# Patient Record
Sex: FEMALE | Race: WHITE
[De-identification: names, ages, dates, MRNs, and addresses within clinical notes are randomized per-mention and may not be internally consistent; named-entity substitution may affect disease eponyms.]

---

## 2018-03-17 ENCOUNTER — HOSPITAL ENCOUNTER (EMERGENCY)
Dept: HOSPITAL 17 - PHED | Age: 38
Discharge: HOME | End: 2018-03-17
Payer: COMMERCIAL

## 2018-03-17 VITALS
SYSTOLIC BLOOD PRESSURE: 127 MMHG | HEART RATE: 101 BPM | OXYGEN SATURATION: 95 % | DIASTOLIC BLOOD PRESSURE: 82 MMHG | RESPIRATION RATE: 16 BRPM

## 2018-03-17 VITALS — HEIGHT: 65 IN | WEIGHT: 136.69 LBS | BODY MASS INDEX: 22.77 KG/M2

## 2018-03-17 VITALS
TEMPERATURE: 98.3 F | OXYGEN SATURATION: 100 % | RESPIRATION RATE: 16 BRPM | HEART RATE: 112 BPM | DIASTOLIC BLOOD PRESSURE: 95 MMHG | SYSTOLIC BLOOD PRESSURE: 151 MMHG

## 2018-03-17 VITALS — SYSTOLIC BLOOD PRESSURE: 152 MMHG | DIASTOLIC BLOOD PRESSURE: 91 MMHG

## 2018-03-17 VITALS — RESPIRATION RATE: 16 BRPM | OXYGEN SATURATION: 95 %

## 2018-03-17 DIAGNOSIS — I47.1: Primary | ICD-10-CM

## 2018-03-17 DIAGNOSIS — R42: ICD-10-CM

## 2018-03-17 DIAGNOSIS — R51: ICD-10-CM

## 2018-03-17 DIAGNOSIS — R00.2: ICD-10-CM

## 2018-03-17 DIAGNOSIS — R94.31: ICD-10-CM

## 2018-03-17 LAB
ALBUMIN SERPL-MCNC: 4.1 GM/DL (ref 3.4–5)
ALP SERPL-CCNC: 84 U/L (ref 45–117)
ALT SERPL-CCNC: 23 U/L (ref 10–53)
AST SERPL-CCNC: 13 U/L (ref 15–37)
BASOPHILS # BLD AUTO: 0 TH/MM3 (ref 0–0.2)
BASOPHILS NFR BLD: 0.3 % (ref 0–2)
BILIRUB SERPL-MCNC: 0.3 MG/DL (ref 0.2–1)
BUN SERPL-MCNC: 10 MG/DL (ref 7–18)
CALCIUM SERPL-MCNC: 9.2 MG/DL (ref 8.5–10.1)
CHLORIDE SERPL-SCNC: 104 MEQ/L (ref 98–107)
CREAT SERPL-MCNC: 0.92 MG/DL (ref 0.5–1)
EOSINOPHIL # BLD: 0.1 TH/MM3 (ref 0–0.4)
EOSINOPHIL NFR BLD: 1.4 % (ref 0–4)
ERYTHROCYTE [DISTWIDTH] IN BLOOD BY AUTOMATED COUNT: 12.5 % (ref 11.6–17.2)
GFR SERPLBLD BASED ON 1.73 SQ M-ARVRAT: 68 ML/MIN (ref 89–?)
GLUCOSE SERPL-MCNC: 138 MG/DL (ref 74–106)
HCO3 BLD-SCNC: 27.2 MEQ/L (ref 21–32)
HCT VFR BLD CALC: 45.6 % (ref 35–46)
HGB BLD-MCNC: 15.8 GM/DL (ref 11.6–15.3)
INR PPP: 1 RATIO
LYMPHOCYTES # BLD AUTO: 2.7 TH/MM3 (ref 1–4.8)
LYMPHOCYTES NFR BLD AUTO: 30.6 % (ref 9–44)
MCH RBC QN AUTO: 31.2 PG (ref 27–34)
MCHC RBC AUTO-ENTMCNC: 34.5 % (ref 32–36)
MCV RBC AUTO: 90.3 FL (ref 80–100)
MONOCYTE #: 0.5 TH/MM3 (ref 0–0.9)
MONOCYTES NFR BLD: 5.2 % (ref 0–8)
NEUTROPHILS # BLD AUTO: 5.6 TH/MM3 (ref 1.8–7.7)
NEUTROPHILS NFR BLD AUTO: 62.5 % (ref 16–70)
PLATELET # BLD: 276 TH/MM3 (ref 150–450)
PMV BLD AUTO: 7.6 FL (ref 7–11)
PROT SERPL-MCNC: 8.7 GM/DL (ref 6.4–8.2)
PROTHROMBIN TIME: 10.6 SEC (ref 9.8–11.6)
RBC # BLD AUTO: 5.05 MIL/MM3 (ref 4–5.3)
SODIUM SERPL-SCNC: 137 MEQ/L (ref 136–145)
TROPONIN I SERPL-MCNC: (no result) NG/ML (ref 0.02–0.05)
WBC # BLD AUTO: 8.9 TH/MM3 (ref 4–11)

## 2018-03-17 PROCEDURE — 84443 ASSAY THYROID STIM HORMONE: CPT

## 2018-03-17 PROCEDURE — 99285 EMERGENCY DEPT VISIT HI MDM: CPT

## 2018-03-17 PROCEDURE — 71045 X-RAY EXAM CHEST 1 VIEW: CPT

## 2018-03-17 PROCEDURE — 85730 THROMBOPLASTIN TIME PARTIAL: CPT

## 2018-03-17 PROCEDURE — 84484 ASSAY OF TROPONIN QUANT: CPT

## 2018-03-17 PROCEDURE — 93005 ELECTROCARDIOGRAM TRACING: CPT

## 2018-03-17 PROCEDURE — 85610 PROTHROMBIN TIME: CPT

## 2018-03-17 PROCEDURE — 85025 COMPLETE CBC W/AUTO DIFF WBC: CPT

## 2018-03-17 PROCEDURE — 70551 MRI BRAIN STEM W/O DYE: CPT

## 2018-03-17 PROCEDURE — 80053 COMPREHEN METABOLIC PANEL: CPT

## 2018-03-17 NOTE — PD
HPI


Chief Complaint:  Cardiac Complaint


Time Seen by Provider:  13:12


Travel History


International Travel<30 days:  No


Contact w/Intl Traveler<30days:  No


Traveled to known affect area:  No





History of Present Illness


HPI


37-year-old female complains of palpitation, headache, dizziness.  Patient 

states that she has history of intermittent SVT since she was younger.  Patient 

was seen by cardiologist on personal physician and had thyroid test and 

echocardiogram done in the past.  Patient is not on any medication for 

tachycardia.  Patient states that she has tachycardia in the 140s-160s at home 

for the past 3 days.  Patient denies any excessive caffeine intake.  Patient 

denies any chest pain or shortness of breath.  Patient states that she has had 

intermittent lightheadedness, feeling things spinning around her the past 4 

days.  Patient states that she has mild aching headache diffuse over the head.  

Patient denies any visual change.  Patient denies any neck pain.  Patient 

denies abdominal pain.  Patient denies any nausea vomiting diarrhea.  Patient 

was seen in the local urgent care center and advised to take meclizine 3 times 

a day.  Patient states that she has intermittent lightheadedness despite taking 

the meclizine.  Patient states that the dizziness is not associated with head 

movement.  Patient denies any ringing in the ears.  Patient has been eating 

well.  Patient denies any chance of being pregnant.  Patient denies any dysuria 

or frequency.  Patient denies any vaginal discharge or bleeding.  Patient 

states that she was on medication for control intermittent SVT in the past.





PFSH


Past Medical History


Medical History:  Denies Significant Hx


Tetanus Vaccination:  < 5 Years


Influenza Vaccination:  No


Pregnant?:  Not Pregnant


LMP:  2/1/18





Past Surgical History


Other Surgery:  Yes (CYST REMOVED FROM )





Social History


Alcohol Use:  Yes (SOCIAL)


Tobacco Use:  No


Substance Use:  No





Allergies-Medications


(Allergen,Severity, Reaction):  


Coded Allergies:  


     No Known Allergies (Unverified , 3/17/18)


Reported Meds & Prescriptions





Reported Meds & Active Scripts


Active


No Active Prescriptions or Reported Medications    








Review of Systems


General / Constitutional:  No: Fever


Eyes:  No: Visual changes


HENT:  Positive: Headaches, Lightheadedness


Cardiovascular:  Positive: Tachycardia, No: Chest Pain or Discomfort


Respiratory:  No: Shortness of Breath


Gastrointestinal:  No: Abdominal Pain


Genitourinary:  No: Dysuria


Musculoskeletal:  No: Pain


Skin:  No Rash


Neurologic:  No: Weakness


Psychiatric:  No: Depression


Endocrine:  No: Polydipsia


Hematologic/Lymphatic:  No: Easy Bruising





Physical Exam


Narrative


GENERAL: Well-nourished, well-developed patient.


SKIN: Focused skin assessment warm/dry.


HEAD: Normocephalic.


EYES: No scleral icterus. No injection or drainage.  Pupils 2 mm equal reactive.


NECK: Supple, trachea midline. No JVD or lymphadenopathy.


CARDIOVASCULAR: Mild tachycardia rate and rhythm without murmurs, gallops, or 

rubs. 


RESPIRATORY: Breath sounds equal bilaterally. No accessory muscle use.


GASTROINTESTINAL: Abdomen soft, non-tender, nondistended. 


MUSCULOSKELETAL: No cyanosis, or edema. 


BACK: Nontender without obvious deformity. No CVA tenderness.


Neurologic exam normal.





Data


Data


Last Documented VS





Vital Signs








  Date Time  Temp Pulse Resp B/P (MAP) Pulse Ox O2 Delivery O2 Flow Rate FiO2


 


3/17/18 15:16  101 16 127/82 (97) 95 Room Air  


 


3/17/18 13:01 98.3       








Orders





 Orders


Electrocardiogram (3/17/18 13:26)


Complete Blood Count With Diff (3/17/18 13:26)


Comprehensive Metabolic Panel (3/17/18 13:26)


Troponin I (3/17/18 13:26)


Prothrombin Time / Inr (Pt) (3/17/18 13:26)


Act Partial Throm Time (Ptt) (3/17/18 13:26)


Thyroid Stimulating Hormone (3/17/18 13:26)


Chest, Single Ap (3/17/18 13:26)


Iv Access Insert/Monitor (3/17/18 13:26)


Ecg Monitoring (3/17/18 13:26)


Oximetry (3/17/18 13:26)


Mri Brain W/O Contrast (3/17/18 13:26)


Meclizine (Antivert) (3/17/18 16:00)





Labs





Laboratory Tests








Test


  3/17/18


13:40


 


White Blood Count 8.9 TH/MM3 


 


Red Blood Count 5.05 MIL/MM3 


 


Hemoglobin 15.8 GM/DL 


 


Hematocrit 45.6 % 


 


Mean Corpuscular Volume 90.3 FL 


 


Mean Corpuscular Hemoglobin 31.2 PG 


 


Mean Corpuscular Hemoglobin


Concent 34.5 % 


 


 


Red Cell Distribution Width 12.5 % 


 


Platelet Count 276 TH/MM3 


 


Mean Platelet Volume 7.6 FL 


 


Neutrophils (%) (Auto) 62.5 % 


 


Lymphocytes (%) (Auto) 30.6 % 


 


Monocytes (%) (Auto) 5.2 % 


 


Eosinophils (%) (Auto) 1.4 % 


 


Basophils (%) (Auto) 0.3 % 


 


Neutrophils # (Auto) 5.6 TH/MM3 


 


Lymphocytes # (Auto) 2.7 TH/MM3 


 


Monocytes # (Auto) 0.5 TH/MM3 


 


Eosinophils # (Auto) 0.1 TH/MM3 


 


Basophils # (Auto) 0.0 TH/MM3 


 


CBC Comment DIFF FINAL 


 


Differential Comment  


 


Prothrombin Time 10.6 SEC 


 


Prothromb Time International


Ratio 1.0 RATIO 


 


 


Activated Partial


Thromboplast Time 28.6 SEC 


 


 


Blood Urea Nitrogen 10 MG/DL 


 


Creatinine 0.92 MG/DL 


 


Random Glucose 138 MG/DL 


 


Total Protein 8.7 GM/DL 


 


Albumin 4.1 GM/DL 


 


Calcium Level 9.2 MG/DL 


 


Alkaline Phosphatase 84 U/L 


 


Aspartate Amino Transf


(AST/SGOT) 13 U/L 


 


 


Alanine Aminotransferase


(ALT/SGPT) 23 U/L 


 


 


Total Bilirubin 0.3 MG/DL 


 


Sodium Level 137 MEQ/L 


 


Potassium Level 3.5 MEQ/L 


 


Chloride Level 104 MEQ/L 


 


Carbon Dioxide Level 27.2 MEQ/L 


 


Anion Gap 6 MEQ/L 


 


Estimat Glomerular Filtration


Rate 68 ML/MIN 


 


 


Troponin I


  LESS THAN 0.02


NG/ML


 


Thyroid Stimulating Hormone


3rd Gen 2.720 uIU/ML 


 











MDM


Medical Decision Making


Medical Screen Exam Complete:  Yes


Emergency Medical Condition:  Yes


Interpretation(s)





Last Impressions








Chest X-Ray 3/17/18 1326 Signed





Impressions: 





 Service Date/Time:  Saturday, March 17, 2018 13:49 - CONCLUSION: No acute 





 disease.       Jannet Kunz MD 





1454 PM.  CBC within normal limits.  CMP within normal limits.  Cardiac enzymes 

are normal.


Differential Diagnosis


Differential diagnosis including SVT, sinus tachycardia, PVCs, atrial 

fibrillation, atrial flutter, thyroid disease, vertigo, electrolyte imbalance, 

TIA, CVA.


Narrative Course


37-year-old female with tachycardia, dizziness, headache.  History of SVT in 

the past.  Meclizine 25 mg p.o. given.





Diagnosis





 Primary Impression:  


 Paroxysmal SVT (supraventricular tachycardia)


Patient Instructions:  General Instructions





***Additional Instructions:  


Metoprolol as needed for tachycardia.  Meclizine as needed for the dizziness.  

Follow-up with personal physician and neurologist.  Return if worse.


***Med/Other Pt SpecificInfo:  Prescription(s) given


Scripts


Meclizine (Meclizine) 25 Mg Tab


25 MG PO TID Y for VERTIGO, #21 TAB 0 Refills


   Prov: Rodney Mcghee MD         3/17/18 


Metoprolol Tartrate (Metoprolol Tartrate) 25 Mg Tab


25 MG PO BID for TACHYCARDIA, #60 TAB 0 Refills


   Prov: Rodney Mcghee MD         3/17/18


Disposition:  01 DISCHARGE HOME


Condition:  Stable











Rodney Mcghee MD Mar 17, 2018 13:48

## 2018-03-17 NOTE — RADRPT
EXAM DATE/TIME:  03/17/2018 13:49 

 

[facility] COMPARISON:     

No previous studies available for comparison.

 

                     

INDICATIONS :     

Rapid heart rate, dizzy

                     

 

MEDICAL HISTORY :     

None.          

 

SURGICAL HISTORY :     

None.   

 

ENCOUNTER:     

Initial                                        

 

ACUITY:     

4 - 6 days      

 

PAIN SCORE:     

0/10

 

LOCATION:     

Bilateral chest 

 

FINDINGS:     

A single view of the chest demonstrates the lungs to be symmetrically aerated without evidence of mas
s, infiltrate or effusion.  The cardiomediastinal contours are unremarkable.  Osseous structures are 
intact.

 

CONCLUSION:     No acute disease.  

 

 

 

 Jannet Kunz MD on March 17, 2018 at 13:57           

Board Certified Radiologist.

 This report was verified electronically.

## 2018-03-17 NOTE — RADRPT
EXAM DATE/TIME:  03/17/2018 15:28 

 

[facility] COMPARISON:     

No previous studies available for comparison.

       

 

 

INDICATIONS :     

Cephalgia. Dizziness.

                     

 

MEDICAL HISTORY :     

None.     

 

SURGICAL HISTORY :     

None.     

 

ENCOUNTER:     

Initial

 

ACUITY:     

1 day

 

PAIN SCORE:     

0/10

 

LOCATION:       

cranial 

 

TECHNIQUE:     

Multiplanar, multisequence MRI of the brain was performed without contrast.

 

FINDINGS:     

 

CEREBRUM:     

The ventricles are normal for age.  No evidence of midline shift, mass lesion, hemorrhage or acute in
farction.  No extraaxial fluid collections are seen.  The pituitary gland and suprasellar cistern are
 normal in configuration.

 

WHITE MATTER:     

No significant signal abnormalities are seen in the white matter.

 

POSTERIOR FOSSA:     

The cerebellum and brainstem are intact.  The 4th ventricle is midline. The cerebellopontine angle is
 unremarkable.  The cerebellar tonsils are normal in position.

 

DIFFUSION IMAGING:     

No focal areas of restricted diffusion are seen.  No evidence of acute infarction.

 

EXTRACRANIAL:     

The visualized portions of the orbits and paranasal sinuses are unremarkable.

 

CONCLUSION:     

1. No acute intracranial abnormality.

 

 

 

 Rakesh Simmons MD on March 17, 2018 at 15:37           

Board Certified Radiologist.

 This report was verified electronically.

## 2018-03-18 NOTE — EKG
Date Performed: 03/17/2018       Time Performed: 13:46:59

 

PTAGE:      37 years

 

EKG:      SINUS TACHYCARDIA POSSIBLE RIGHT ATRIAL ENLARGEMENT POSSIBLE LEFT ATRIAL ENLARGEMENT MINIMA
L ST DEPRESSION ABNORMAL RHYTHM ECG 

 

NO PREVIOUS TRACING            

 

DOCTOR:   Jori Cooper  Interpretating Date/Time  03/18/2018 13:19:46